# Patient Record
Sex: FEMALE | Race: BLACK OR AFRICAN AMERICAN | NOT HISPANIC OR LATINO | Employment: UNEMPLOYED | URBAN - NONMETROPOLITAN AREA
[De-identification: names, ages, dates, MRNs, and addresses within clinical notes are randomized per-mention and may not be internally consistent; named-entity substitution may affect disease eponyms.]

---

## 2024-05-20 ENCOUNTER — HOSPITAL ENCOUNTER (EMERGENCY)
Facility: HOSPITAL | Age: 59
Discharge: HOME | End: 2024-05-20
Attending: EMERGENCY MEDICINE
Payer: COMMERCIAL

## 2024-05-20 ENCOUNTER — APPOINTMENT (EMERGENCY)
Dept: CT IMAGING | Facility: HOSPITAL | Age: 59
End: 2024-05-20
Payer: COMMERCIAL

## 2024-05-20 ENCOUNTER — APPOINTMENT (EMERGENCY)
Dept: RADIOLOGY | Facility: HOSPITAL | Age: 59
End: 2024-05-20
Payer: COMMERCIAL

## 2024-05-20 VITALS
HEART RATE: 78 BPM | WEIGHT: 164.9 LBS | BODY MASS INDEX: 28.15 KG/M2 | RESPIRATION RATE: 22 BRPM | SYSTOLIC BLOOD PRESSURE: 196 MMHG | TEMPERATURE: 97.2 F | HEIGHT: 64 IN | OXYGEN SATURATION: 100 % | DIASTOLIC BLOOD PRESSURE: 79 MMHG

## 2024-05-20 DIAGNOSIS — R07.9 CHEST PAIN: Primary | ICD-10-CM

## 2024-05-20 DIAGNOSIS — K20.90 ESOPHAGITIS: ICD-10-CM

## 2024-05-20 LAB
2HR DELTA HS TROPONIN: 0 NG/L
ALBUMIN SERPL BCP-MCNC: 4.4 G/DL (ref 3.5–5)
ALP SERPL-CCNC: 84 U/L (ref 34–104)
ALT SERPL W P-5'-P-CCNC: 38 U/L (ref 7–52)
ANION GAP SERPL CALCULATED.3IONS-SCNC: 4 MMOL/L (ref 4–13)
APTT PPP: 24 SECONDS (ref 23–37)
AST SERPL W P-5'-P-CCNC: 24 U/L (ref 13–39)
ATRIAL RATE: 84 BPM
BILIRUB SERPL-MCNC: 0.37 MG/DL (ref 0.2–1)
BNP SERPL-MCNC: 48 PG/ML (ref 0–100)
BUN SERPL-MCNC: 9 MG/DL (ref 5–25)
CALCIUM SERPL-MCNC: 9.5 MG/DL (ref 8.4–10.2)
CARDIAC TROPONIN I PNL SERPL HS: 3 NG/L
CARDIAC TROPONIN I PNL SERPL HS: 3 NG/L
CHLORIDE SERPL-SCNC: 104 MMOL/L (ref 96–108)
CO2 SERPL-SCNC: 29 MMOL/L (ref 21–32)
CREAT SERPL-MCNC: 0.69 MG/DL (ref 0.6–1.3)
D DIMER PPP FEU-MCNC: 0.43 UG/ML FEU
ERYTHROCYTE [DISTWIDTH] IN BLOOD BY AUTOMATED COUNT: 13.2 % (ref 11.6–15.1)
GFR SERPL CREATININE-BSD FRML MDRD: 94 ML/MIN/1.73SQ M
GLUCOSE SERPL-MCNC: 172 MG/DL (ref 65–140)
HCT VFR BLD AUTO: 41.4 % (ref 34.8–46.1)
HGB BLD-MCNC: 13.9 G/DL (ref 11.5–15.4)
INR PPP: 0.93 (ref 0.84–1.19)
LIPASE SERPL-CCNC: 14 U/L (ref 11–82)
MAGNESIUM SERPL-MCNC: 2.1 MG/DL (ref 1.9–2.7)
MCH RBC QN AUTO: 29 PG (ref 26.8–34.3)
MCHC RBC AUTO-ENTMCNC: 33.6 G/DL (ref 31.4–37.4)
MCV RBC AUTO: 86 FL (ref 82–98)
P AXIS: 71 DEGREES
PLATELET # BLD AUTO: 226 THOUSANDS/UL (ref 149–390)
PMV BLD AUTO: 11.1 FL (ref 8.9–12.7)
POTASSIUM SERPL-SCNC: 4.4 MMOL/L (ref 3.5–5.3)
PR INTERVAL: 144 MS
PROT SERPL-MCNC: 7.2 G/DL (ref 6.4–8.4)
PROTHROMBIN TIME: 12.7 SECONDS (ref 11.6–14.5)
QRS AXIS: 66 DEGREES
QRSD INTERVAL: 74 MS
QT INTERVAL: 354 MS
QTC INTERVAL: 418 MS
RBC # BLD AUTO: 4.8 MILLION/UL (ref 3.81–5.12)
SODIUM SERPL-SCNC: 137 MMOL/L (ref 135–147)
T WAVE AXIS: 79 DEGREES
VENTRICULAR RATE: 84 BPM
WBC # BLD AUTO: 4.21 THOUSAND/UL (ref 4.31–10.16)

## 2024-05-20 PROCEDURE — 83735 ASSAY OF MAGNESIUM: CPT | Performed by: EMERGENCY MEDICINE

## 2024-05-20 PROCEDURE — 71275 CT ANGIOGRAPHY CHEST: CPT

## 2024-05-20 PROCEDURE — 71046 X-RAY EXAM CHEST 2 VIEWS: CPT

## 2024-05-20 PROCEDURE — 99285 EMERGENCY DEPT VISIT HI MDM: CPT

## 2024-05-20 PROCEDURE — 85610 PROTHROMBIN TIME: CPT | Performed by: EMERGENCY MEDICINE

## 2024-05-20 PROCEDURE — 85730 THROMBOPLASTIN TIME PARTIAL: CPT | Performed by: EMERGENCY MEDICINE

## 2024-05-20 PROCEDURE — 83690 ASSAY OF LIPASE: CPT | Performed by: EMERGENCY MEDICINE

## 2024-05-20 PROCEDURE — 99285 EMERGENCY DEPT VISIT HI MDM: CPT | Performed by: EMERGENCY MEDICINE

## 2024-05-20 PROCEDURE — 93010 ELECTROCARDIOGRAM REPORT: CPT | Performed by: INTERNAL MEDICINE

## 2024-05-20 PROCEDURE — 93005 ELECTROCARDIOGRAM TRACING: CPT

## 2024-05-20 PROCEDURE — 96375 TX/PRO/DX INJ NEW DRUG ADDON: CPT

## 2024-05-20 PROCEDURE — 84484 ASSAY OF TROPONIN QUANT: CPT | Performed by: EMERGENCY MEDICINE

## 2024-05-20 PROCEDURE — 83880 ASSAY OF NATRIURETIC PEPTIDE: CPT | Performed by: EMERGENCY MEDICINE

## 2024-05-20 PROCEDURE — 96374 THER/PROPH/DIAG INJ IV PUSH: CPT

## 2024-05-20 PROCEDURE — 85025 COMPLETE CBC W/AUTO DIFF WBC: CPT | Performed by: EMERGENCY MEDICINE

## 2024-05-20 PROCEDURE — 36415 COLL VENOUS BLD VENIPUNCTURE: CPT | Performed by: EMERGENCY MEDICINE

## 2024-05-20 PROCEDURE — 85379 FIBRIN DEGRADATION QUANT: CPT | Performed by: EMERGENCY MEDICINE

## 2024-05-20 PROCEDURE — 80053 COMPREHEN METABOLIC PANEL: CPT | Performed by: EMERGENCY MEDICINE

## 2024-05-20 RX ORDER — LIDOCAINE HYDROCHLORIDE 20 MG/ML
15 SOLUTION OROPHARYNGEAL ONCE
Status: COMPLETED | OUTPATIENT
Start: 2024-05-20 | End: 2024-05-20

## 2024-05-20 RX ORDER — ASPIRIN 81 MG/1
81 TABLET ORAL DAILY
COMMUNITY

## 2024-05-20 RX ORDER — GABAPENTIN 600 MG/1
600 TABLET ORAL 3 TIMES DAILY
COMMUNITY
Start: 2024-02-16

## 2024-05-20 RX ORDER — ASPIRIN 81 MG/1
324 TABLET, CHEWABLE ORAL ONCE
Status: COMPLETED | OUTPATIENT
Start: 2024-05-20 | End: 2024-05-20

## 2024-05-20 RX ORDER — AMLODIPINE BESYLATE 10 MG/1
10 TABLET ORAL DAILY
COMMUNITY
Start: 2024-04-10 | End: 2025-04-10

## 2024-05-20 RX ORDER — PROCHLORPERAZINE MALEATE 5 MG/1
5 TABLET ORAL EVERY 8 HOURS PRN
COMMUNITY
Start: 2024-04-10

## 2024-05-20 RX ORDER — PANTOPRAZOLE SODIUM 40 MG/1
40 TABLET, DELAYED RELEASE ORAL DAILY
COMMUNITY
Start: 2024-04-10 | End: 2025-04-10

## 2024-05-20 RX ORDER — HYDRALAZINE HYDROCHLORIDE 10 MG/1
10 TABLET, FILM COATED ORAL 2 TIMES DAILY
COMMUNITY
Start: 2024-02-20

## 2024-05-20 RX ORDER — FENTANYL CITRATE 50 UG/ML
25 INJECTION, SOLUTION INTRAMUSCULAR; INTRAVENOUS ONCE
Status: COMPLETED | OUTPATIENT
Start: 2024-05-20 | End: 2024-05-20

## 2024-05-20 RX ORDER — INSULIN GLARGINE 100 [IU]/ML
40 INJECTION, SOLUTION SUBCUTANEOUS 2 TIMES DAILY
COMMUNITY
Start: 2024-03-14

## 2024-05-20 RX ORDER — METOCLOPRAMIDE HYDROCHLORIDE 5 MG/5ML
10 SOLUTION ORAL ONCE
Status: COMPLETED | OUTPATIENT
Start: 2024-05-20 | End: 2024-05-20

## 2024-05-20 RX ORDER — INSULIN LISPRO 100 [IU]/ML
12 INJECTION, SOLUTION INTRAVENOUS; SUBCUTANEOUS 2 TIMES DAILY WITH MEALS
COMMUNITY
Start: 2024-03-14

## 2024-05-20 RX ORDER — MAGNESIUM HYDROXIDE/ALUMINUM HYDROXICE/SIMETHICONE 120; 1200; 1200 MG/30ML; MG/30ML; MG/30ML
30 SUSPENSION ORAL ONCE
Status: COMPLETED | OUTPATIENT
Start: 2024-05-20 | End: 2024-05-20

## 2024-05-20 RX ORDER — MORPHINE SULFATE 4 MG/ML
4 INJECTION, SOLUTION INTRAMUSCULAR; INTRAVENOUS ONCE
Status: COMPLETED | OUTPATIENT
Start: 2024-05-20 | End: 2024-05-20

## 2024-05-20 RX ORDER — PANTOPRAZOLE SODIUM 20 MG/1
20 TABLET, DELAYED RELEASE ORAL DAILY
Qty: 20 TABLET | Refills: 0 | Status: SHIPPED | OUTPATIENT
Start: 2024-05-20

## 2024-05-20 RX ADMIN — FENTANYL CITRATE 25 MCG: 50 INJECTION INTRAMUSCULAR; INTRAVENOUS at 13:10

## 2024-05-20 RX ADMIN — MORPHINE SULFATE 4 MG: 4 INJECTION INTRAVENOUS at 12:28

## 2024-05-20 RX ADMIN — IOHEXOL 85 ML: 350 INJECTION, SOLUTION INTRAVENOUS at 13:42

## 2024-05-20 RX ADMIN — LIDOCAINE HYDROCHLORIDE 15 ML: 20 SOLUTION ORAL at 14:32

## 2024-05-20 RX ADMIN — ALUMINUM HYDROXIDE, MAGNESIUM HYDROXIDE, AND SIMETHICONE 30 ML: 200; 200; 20 SUSPENSION ORAL at 14:34

## 2024-05-20 RX ADMIN — ASPIRIN 81 MG 324 MG: 81 TABLET ORAL at 12:29

## 2024-05-20 RX ADMIN — METOCLOPRAMIDE HYDROCHLORIDE 10 MG: 5 SOLUTION ORAL at 14:34

## 2024-05-20 NOTE — ED PROVIDER NOTES
"History  Chief Complaint   Patient presents with    Chest Pain     Patient reports intermittent chest pain beginning last week. States the pain is now constant, feels that her \"heart is doing a flip\".      Patient is a 59-year-old female presenting to the emergency department complaining of chest pain that is been going on intermittently for the past week, states it feels like someone squeezing her heart and pain is now radiating to her back, she reports it hurts if she moves a certain way or takes a deep breath or coughs, she has no known prior history of CAD, however has a history of diabetes, hypertension and high cholesterol, she denies any recent illness or injury, no long distance traveling, no recent procedures, she did not take anything for pain prior to coming to the emergency department today        Prior to Admission Medications   Prescriptions Last Dose Informant Patient Reported? Taking?   Insulin Glargine Solostar (Lantus SoloStar) 100 UNIT/ML SOPN   Yes Yes   Sig: Inject 40 Units under the skin 2 (two) times a day   amLODIPine (NORVASC) 10 mg tablet   Yes Yes   Sig: Take 10 mg by mouth daily   aspirin (ECOTRIN LOW STRENGTH) 81 mg EC tablet   Yes Yes   Sig: Take 81 mg by mouth daily   gabapentin (NEURONTIN) 600 MG tablet   Yes Yes   Sig: Take 600 mg by mouth 3 (three) times a day   hydrALAZINE (APRESOLINE) 10 mg tablet   Yes Yes   Sig: Take 10 mg by mouth 2 (two) times a day   insulin lispro (HumaLOG) 100 units/mL injection pen   Yes Yes   Sig: Inject 12 Units under the skin 2 (two) times a day with meals   pantoprazole (PROTONIX) 40 mg tablet   Yes Yes   Sig: Take 40 mg by mouth daily   prochlorperazine (COMPAZINE) 5 mg tablet   Yes Yes   Sig: Take 5 mg by mouth every 8 (eight) hours as needed      Facility-Administered Medications: None       Past Medical History:   Diagnosis Date    Chronic pain syndrome     Diabetic neuropathy (HCC)     Hyperlipidemia     Hyperlipidemia     Hypertension        Past " Surgical History:   Procedure Laterality Date    CHOLECYSTECTOMY      EYE SURGERY  02/2024    SALIVARY GLAND SURGERY  09/2023    Tumor    TUBAL LIGATION         History reviewed. No pertinent family history.  I have reviewed and agree with the history as documented.    E-Cigarette/Vaping    E-Cigarette Use Never User      E-Cigarette/Vaping Substances     Social History     Tobacco Use    Smoking status: Never    Smokeless tobacco: Never   Vaping Use    Vaping status: Never Used   Substance Use Topics    Alcohol use: Not Currently    Drug use: Yes     Types: Marijuana       Review of Systems   Constitutional: Negative.    HENT: Negative.     Eyes: Negative.    Respiratory: Negative.     Cardiovascular:  Positive for chest pain.   Gastrointestinal: Negative.    Endocrine: Negative.    Genitourinary: Negative.    Musculoskeletal:  Positive for back pain.   Skin: Negative.    Allergic/Immunologic: Negative.    Neurological: Negative.    Hematological: Negative.    Psychiatric/Behavioral: Negative.         Physical Exam  Physical Exam  Constitutional:       Appearance: She is well-developed.   HENT:      Head: Normocephalic and atraumatic.   Eyes:      Conjunctiva/sclera: Conjunctivae normal.      Pupils: Pupils are equal, round, and reactive to light.   Cardiovascular:      Rate and Rhythm: Normal rate and regular rhythm.      Heart sounds: Murmur heard.      Systolic murmur is present with a grade of 2/6.   Pulmonary:      Effort: Pulmonary effort is normal.      Breath sounds: Normal breath sounds.   Abdominal:      Palpations: Abdomen is soft.   Musculoskeletal:         General: Normal range of motion.      Cervical back: Normal range of motion and neck supple.   Skin:     General: Skin is warm and dry.   Neurological:      Mental Status: She is alert and oriented to person, place, and time.         Vital Signs  ED Triage Vitals [05/20/24 1210]   Temperature Pulse Respirations Blood Pressure SpO2   (!) 97.2 °F (36.2  °C) 82 16 (!) 198/88 99 %      Temp Source Heart Rate Source Patient Position - Orthostatic VS BP Location FiO2 (%)   Temporal Monitor Lying Left arm --      Pain Score       10 - Worst Possible Pain           Vitals:    05/20/24 1415 05/20/24 1430 05/20/24 1445 05/20/24 1500   BP: (!) 177/79 162/78 (!) 196/79    Pulse: 63 63 67 78   Patient Position - Orthostatic VS:             Visual Acuity      ED Medications  Medications   aspirin chewable tablet 324 mg (324 mg Oral Given 5/20/24 1229)   morphine injection 4 mg (4 mg Intravenous Given 5/20/24 1228)   fentaNYL injection 25 mcg (25 mcg Intravenous Given 5/20/24 1310)   iohexol (OMNIPAQUE) 350 MG/ML injection (MULTI-DOSE) 85 mL (85 mL Intravenous Given 5/20/24 1342)   Lidocaine Viscous HCl (XYLOCAINE) 2 % mucosal solution 15 mL (15 mL Swish & Spit Given 5/20/24 1432)   metoclopramide (REGLAN) oral solution 10 mg (10 mg Oral Given 5/20/24 1434)   aluminum-magnesium hydroxide-simethicone (MAALOX) oral suspension 30 mL (30 mL Oral Given 5/20/24 1434)       Diagnostic Studies  Results Reviewed       Procedure Component Value Units Date/Time    HS Troponin I 2hr [[id number]]  (Normal) Collected: 05/20/24 1418    Lab Status: Final result Specimen: Blood from Arm, Right Updated: 05/20/24 1450     hs TnI 2hr 3 ng/L      Delta 2hr hsTnI 0 ng/L     B-Type Natriuretic Peptide(BNP) [[id number]]  (Normal) Collected: 05/20/24 1226    Lab Status: Final result Specimen: Blood from Arm, Right Updated: 05/20/24 1319     BNP 48 pg/mL     HS Troponin 0hr (reflex protocol) [[id number]]  (Normal) Collected: 05/20/24 1226    Lab Status: Final result Specimen: Blood from Arm, Right Updated: 05/20/24 1258     hs TnI 0hr 3 ng/L     Comprehensive metabolic panel [[id number]]  (Abnormal) Collected: 05/20/24 1226    Lab Status: Final result Specimen: Blood from Arm, Right Updated: 05/20/24 1251     Sodium 137 mmol/L      Potassium 4.4 mmol/L      Chloride 104 mmol/L      CO2 29 mmol/L      ANION  GAP 4 mmol/L      BUN 9 mg/dL      Creatinine 0.69 mg/dL      Glucose 172 mg/dL      Calcium 9.5 mg/dL      AST 24 U/L      ALT 38 U/L      Alkaline Phosphatase 84 U/L      Total Protein 7.2 g/dL      Albumin 4.4 g/dL      Total Bilirubin 0.37 mg/dL      eGFR 94 ml/min/1.73sq m     Narrative:      [employer] guidelines for Chronic Kidney Disease (CKD):     Stage 1 with normal or high GFR (GFR > 90 mL/min/1.73 square meters)    Stage 2 Mild CKD (GFR = 60-89 mL/min/1.73 square meters)    Stage 3A Moderate CKD (GFR = 45-59 mL/min/1.73 square meters)    Stage 3B Moderate CKD (GFR = 30-44 mL/min/1.73 square meters)    Stage 4 Severe CKD (GFR = 15-29 mL/min/1.73 square meters)    Stage 5 End Stage CKD (GFR <15 mL/min/1.73 square meters)  Note: GFR calculation is accurate only with a steady state creatinine    Lipase [[id number]]  (Normal) Collected: 05/20/24 1226    Lab Status: Final result Specimen: Blood from Arm, Right Updated: 05/20/24 1251     Lipase 14 u/L     Magnesium [[id number]]  (Normal) Collected: 05/20/24 1226    Lab Status: Final result Specimen: Blood from Arm, Right Updated: 05/20/24 1251     Magnesium 2.1 mg/dL     D-Dimer [[id number]]  (Normal) Collected: 05/20/24 1226    Lab Status: Final result Specimen: Blood from Arm, Right Updated: 05/20/24 1250     D-Dimer, Quant 0.43 ug/ml FEU     Narrative:      In the evaluation for possible pulmonary embolism, in the appropriate (Well's Score of 4 or less) patient, the age adjusted d-dimer cutoff for this patient can be calculated as:    Age x 0.01 (in ug/mL) for Age-adjusted D-dimer exclusion threshold for a patient over 50 years.    Protime-INR [[id number]]  (Normal) Collected: 05/20/24 1226    Lab Status: Final result Specimen: Blood from Arm, Right Updated: 05/20/24 1247     Protime 12.7 seconds      INR 0.93    APTT [[id number]]  (Normal) Collected: 05/20/24 1226    Lab Status: Final result Specimen: Blood from Arm, Right Updated:  05/20/24 1247     PTT 24 seconds     CBC and differential [[id number]]  (Abnormal) Collected: 05/20/24 1226    Lab Status: Final result Specimen: Blood from Arm, Right Updated: 05/20/24 1244     WBC 4.21 Thousand/uL      RBC 4.80 Million/uL      Hemoglobin 13.9 g/dL      Hematocrit 41.4 %      MCV 86 fL      MCH 29.0 pg      MCHC 33.6 g/dL      RDW 13.2 %      MPV 11.1 fL      Platelets 226 Thousands/uL                    CTA ED chest PE study   Final Result by Davide Salmeron MD (05/20 1414)      No acute findings.      Nonspecific esophagitis.      Workstation performed: [id number]         XR chest 2 views    (Results Pending)              Procedures  ECG 12 Lead Documentation Only    Date/Time: 5/20/2024 12:34 PM    Performed by: Malgorzata Mcmanus DO  Authorized by: Malgorzata Mcmanus DO    Indications / Diagnosis:  Chest pain  ECG reviewed by me, the ED Provider: yes    Patient location:  ED  Previous ECG:     Comparison to cardiac monitor: Yes    Interpretation:     Interpretation: normal    Rate:     ECG rate:  84    ECG rate assessment: normal    Rhythm:     Rhythm: sinus rhythm    T waves:     T waves: inverted      Inverted:  V5 and V6           ED Course             HEART Risk Score      Flowsheet Row Most Recent Value   Heart Score Risk Calculator    History 0 Filed at: 05/20/2024 1828   ECG 0 Filed at: 05/20/2024 1828   Age 1 Filed at: 05/20/2024 1828   Risk Factors 2 Filed at: 05/20/2024 1828   Troponin 0 Filed at: 05/20/2024 1828   HEART Score 3 Filed at: 05/20/2024 1828                                        Medical Decision Making  Exam without evidence of volume overload so doubt heart failure.  EKG without signs of active ischemia.  Given the timing of pain to ER presentation single/delta troponin negative so doubt NSTEMI.  Presentation not consistent with acute PE, pneumothorax (CXR negative), thoracic aortic dissection, pericarditis, tamponade, pneumonia (no signs of infection, CXR  negative), myocarditis.  Heart score low so plan to discharge patient home with PMD follow-up. (no recent illness, Trop negative)    Problems Addressed:  Chest pain: acute illness or injury  Esophagitis: acute illness or injury    Amount and/or Complexity of Data Reviewed  Labs: ordered. Decision-making details documented in ED Course.  Radiology: ordered and independent interpretation performed. Decision-making details documented in ED Course.  ECG/medicine tests: ordered and independent interpretation performed. Decision-making details documented in ED Course.    Risk  OTC drugs.  Prescription drug management.             Disposition  Final diagnoses:   Chest pain   Esophagitis     Time reflects when diagnosis was documented in both MDM as applicable and the Disposition within this note       Time User Action Codes Description Comment    5/20/2024  3:03 PM Malgorzata Mcmanus [R07.9] Chest pain     5/20/2024  3:03 PM Malgorzata Mcmanus [K20.90] Esophagitis           ED Disposition       ED Disposition   Discharge    Condition   Stable    Date/Time   Mon May 20, 2024 1502    Comment   Filomena Sow discharge to home/self care.                   Follow-up Information    None         Discharge Medication List as of 5/20/2024  3:04 PM        START taking these medications    Details   !! pantoprazole (PROTONIX) 20 mg tablet Take 1 tablet (20 mg total) by mouth daily, Starting Mon 5/20/2024, Normal       !! - Potential duplicate medications found. Please discuss with provider.        CONTINUE these medications which have NOT CHANGED    Details   amLODIPine (NORVASC) 10 mg tablet Take 10 mg by mouth daily, Starting Wed 4/10/2024, Until Thu 4/10/2025, Historical Med      aspirin (ECOTRIN LOW STRENGTH) 81 mg EC tablet Take 81 mg by mouth daily, Historical Med      gabapentin (NEURONTIN) 600 MG tablet Take 600 mg by mouth 3 (three) times a day, Starting Fri 2/16/2024, Historical Med      hydrALAZINE (APRESOLINE) 10 mg  tablet Take 10 mg by mouth 2 (two) times a day, Starting Tue 2/20/2024, Historical Med      Insulin Glargine Solostar (Lantus SoloStar) 100 UNIT/ML SOPN Inject 40 Units under the skin 2 (two) times a day, Starting Thu 3/14/2024, Historical Med      insulin lispro (HumaLOG) 100 units/mL injection pen Inject 12 Units under the skin 2 (two) times a day with meals, Starting Thu 3/14/2024, Historical Med      !! pantoprazole (PROTONIX) 40 mg tablet Take 40 mg by mouth daily, Starting Wed 4/10/2024, Until Thu 4/10/2025, Historical Med      prochlorperazine (COMPAZINE) 5 mg tablet Take 5 mg by mouth every 8 (eight) hours as needed, Starting Wed 4/10/2024, Historical Med       !! - Potential duplicate medications found. Please discuss with provider.          No discharge procedures on file.    PDMP Review       None            ED Provider  Electronically Signed by             Malgorzata Mcmanus DO  05/20/24 1827       Malgorzata Mcmanus DO  05/20/24 1828

## 2025-02-15 ENCOUNTER — APPOINTMENT (EMERGENCY)
Dept: CT IMAGING | Facility: HOSPITAL | Age: 60
End: 2025-02-15
Payer: COMMERCIAL

## 2025-02-15 ENCOUNTER — HOSPITAL ENCOUNTER (OUTPATIENT)
Facility: HOSPITAL | Age: 60
Setting detail: OBSERVATION
Discharge: HOME | End: 2025-02-15
Attending: EMERGENCY MEDICINE | Admitting: ANESTHESIOLOGY
Payer: COMMERCIAL

## 2025-02-15 VITALS
DIASTOLIC BLOOD PRESSURE: 73 MMHG | RESPIRATION RATE: 18 BRPM | WEIGHT: 156.31 LBS | SYSTOLIC BLOOD PRESSURE: 154 MMHG | TEMPERATURE: 99.6 F | HEART RATE: 84 BPM | BODY MASS INDEX: 26.69 KG/M2 | HEIGHT: 64 IN | OXYGEN SATURATION: 95 %

## 2025-02-15 DIAGNOSIS — I10 HYPERTENSION: ICD-10-CM

## 2025-02-15 DIAGNOSIS — R51.9 HEADACHE: Primary | ICD-10-CM

## 2025-02-15 PROBLEM — Z86.73 HISTORY OF STROKE: Status: ACTIVE | Noted: 2021-05-04

## 2025-02-15 PROBLEM — E78.2 MIXED HYPERLIPIDEMIA: Status: ACTIVE | Noted: 2023-04-22

## 2025-02-15 PROBLEM — G89.4 CHRONIC PAIN DISORDER: Chronic | Status: ACTIVE | Noted: 2025-02-15

## 2025-02-15 PROBLEM — I16.0 HYPERTENSIVE URGENCY, MALIGNANT: Status: ACTIVE | Noted: 2025-02-15

## 2025-02-15 PROBLEM — I16.0 HYPERTENSIVE URGENCY, MALIGNANT: Status: RESOLVED | Noted: 2025-02-15 | Resolved: 2025-02-15

## 2025-02-15 PROBLEM — I65.23 BILATERAL CAROTID ARTERY STENOSIS: Status: ACTIVE | Noted: 2022-09-08

## 2025-02-15 LAB
2HR DELTA HS TROPONIN: 0 NG/L
ALBUMIN SERPL BCG-MCNC: 4.6 G/DL (ref 3.5–5)
ALP SERPL-CCNC: 97 U/L (ref 34–104)
ALT SERPL W P-5'-P-CCNC: 11 U/L (ref 7–52)
ANION GAP SERPL CALCULATED.3IONS-SCNC: 4 MMOL/L (ref 4–13)
AST SERPL W P-5'-P-CCNC: 12 U/L (ref 13–39)
BASOPHILS # BLD AUTO: 0.03 THOUSANDS/ΜL (ref 0–0.1)
BASOPHILS NFR BLD AUTO: 1 % (ref 0–1)
BILIRUB SERPL-MCNC: 0.59 MG/DL (ref 0.2–1)
BUN SERPL-MCNC: 11 MG/DL (ref 5–25)
CALCIUM SERPL-MCNC: 10 MG/DL (ref 8.4–10.2)
CARDIAC TROPONIN I PNL SERPL HS: 4 NG/L (ref ?–50)
CARDIAC TROPONIN I PNL SERPL HS: 4 NG/L (ref ?–50)
CHLORIDE SERPL-SCNC: 99 MMOL/L (ref 96–108)
CO2 SERPL-SCNC: 32 MMOL/L (ref 21–32)
CREAT SERPL-MCNC: 0.73 MG/DL (ref 0.6–1.3)
EOSINOPHIL # BLD AUTO: 0.09 THOUSAND/ΜL (ref 0–0.61)
EOSINOPHIL NFR BLD AUTO: 2 % (ref 0–6)
ERYTHROCYTE [DISTWIDTH] IN BLOOD BY AUTOMATED COUNT: 13 % (ref 11.6–15.1)
EST. AVERAGE GLUCOSE BLD GHB EST-MCNC: 203 MG/DL
GFR SERPL CREATININE-BSD FRML MDRD: 89 ML/MIN/1.73SQ M
GLUCOSE SERPL-MCNC: 141 MG/DL (ref 65–140)
GLUCOSE SERPL-MCNC: 191 MG/DL (ref 65–140)
GLUCOSE SERPL-MCNC: 248 MG/DL (ref 65–140)
HBA1C MFR BLD: 8.7 %
HCT VFR BLD AUTO: 41.9 % (ref 34.8–46.1)
HGB BLD-MCNC: 14.2 G/DL (ref 11.5–15.4)
IMM GRANULOCYTES # BLD AUTO: 0.01 THOUSAND/UL (ref 0–0.2)
IMM GRANULOCYTES NFR BLD AUTO: 0 % (ref 0–2)
LYMPHOCYTES # BLD AUTO: 0.82 THOUSANDS/ΜL (ref 0.6–4.47)
LYMPHOCYTES NFR BLD AUTO: 14 % (ref 14–44)
MCH RBC QN AUTO: 28.9 PG (ref 26.8–34.3)
MCHC RBC AUTO-ENTMCNC: 33.9 G/DL (ref 31.4–37.4)
MCV RBC AUTO: 85 FL (ref 82–98)
MONOCYTES # BLD AUTO: 0.68 THOUSAND/ΜL (ref 0.17–1.22)
MONOCYTES NFR BLD AUTO: 12 % (ref 4–12)
NEUTROPHILS # BLD AUTO: 4.15 THOUSANDS/ΜL (ref 1.85–7.62)
NEUTS SEG NFR BLD AUTO: 71 % (ref 43–75)
NRBC BLD AUTO-RTO: 0 /100 WBCS
PLATELET # BLD AUTO: 231 THOUSANDS/UL (ref 149–390)
PLATELET # BLD AUTO: 241 THOUSANDS/UL (ref 149–390)
PMV BLD AUTO: 10.2 FL (ref 8.9–12.7)
PMV BLD AUTO: 10.8 FL (ref 8.9–12.7)
POTASSIUM SERPL-SCNC: 4 MMOL/L (ref 3.5–5.3)
PROT SERPL-MCNC: 7.8 G/DL (ref 6.4–8.4)
RBC # BLD AUTO: 4.91 MILLION/UL (ref 3.81–5.12)
SODIUM SERPL-SCNC: 135 MMOL/L (ref 135–147)
TSH SERPL DL<=0.05 MIU/L-ACNC: 1.49 UIU/ML (ref 0.45–4.5)
WBC # BLD AUTO: 5.78 THOUSAND/UL (ref 4.31–10.16)

## 2025-02-15 PROCEDURE — 84484 ASSAY OF TROPONIN QUANT: CPT | Performed by: EMERGENCY MEDICINE

## 2025-02-15 PROCEDURE — 85025 COMPLETE CBC W/AUTO DIFF WBC: CPT | Performed by: EMERGENCY MEDICINE

## 2025-02-15 PROCEDURE — 99285 EMERGENCY DEPT VISIT HI MDM: CPT | Performed by: EMERGENCY MEDICINE

## 2025-02-15 PROCEDURE — 93005 ELECTROCARDIOGRAM TRACING: CPT

## 2025-02-15 PROCEDURE — 80053 COMPREHEN METABOLIC PANEL: CPT | Performed by: EMERGENCY MEDICINE

## 2025-02-15 PROCEDURE — NC001 PR NO CHARGE

## 2025-02-15 PROCEDURE — 96376 TX/PRO/DX INJ SAME DRUG ADON: CPT

## 2025-02-15 PROCEDURE — 83036 HEMOGLOBIN GLYCOSYLATED A1C: CPT | Performed by: PHYSICIAN ASSISTANT

## 2025-02-15 PROCEDURE — 99223 1ST HOSP IP/OBS HIGH 75: CPT | Performed by: ANESTHESIOLOGY

## 2025-02-15 PROCEDURE — 96375 TX/PRO/DX INJ NEW DRUG ADDON: CPT

## 2025-02-15 PROCEDURE — 96374 THER/PROPH/DIAG INJ IV PUSH: CPT

## 2025-02-15 PROCEDURE — 85049 AUTOMATED PLATELET COUNT: CPT | Performed by: PHYSICIAN ASSISTANT

## 2025-02-15 PROCEDURE — 99285 EMERGENCY DEPT VISIT HI MDM: CPT

## 2025-02-15 PROCEDURE — 36415 COLL VENOUS BLD VENIPUNCTURE: CPT | Performed by: EMERGENCY MEDICINE

## 2025-02-15 PROCEDURE — 70450 CT HEAD/BRAIN W/O DYE: CPT

## 2025-02-15 PROCEDURE — 82948 REAGENT STRIP/BLOOD GLUCOSE: CPT

## 2025-02-15 PROCEDURE — 84443 ASSAY THYROID STIM HORMONE: CPT

## 2025-02-15 RX ORDER — LOSARTAN POTASSIUM 50 MG/1
50 TABLET ORAL DAILY
Status: DISCONTINUED | OUTPATIENT
Start: 2025-02-15 | End: 2025-02-15 | Stop reason: HOSPADM

## 2025-02-15 RX ORDER — METOPROLOL TARTRATE 1 MG/ML
5 INJECTION, SOLUTION INTRAVENOUS ONCE
Status: COMPLETED | OUTPATIENT
Start: 2025-02-15 | End: 2025-02-15

## 2025-02-15 RX ORDER — HEPARIN SODIUM 5000 [USP'U]/ML
5000 INJECTION, SOLUTION INTRAVENOUS; SUBCUTANEOUS EVERY 8 HOURS SCHEDULED
Status: DISCONTINUED | OUTPATIENT
Start: 2025-02-15 | End: 2025-02-15 | Stop reason: HOSPADM

## 2025-02-15 RX ORDER — INSULIN LISPRO 100 [IU]/ML
12 INJECTION, SOLUTION INTRAVENOUS; SUBCUTANEOUS
Status: DISCONTINUED | OUTPATIENT
Start: 2025-02-15 | End: 2025-02-15 | Stop reason: HOSPADM

## 2025-02-15 RX ORDER — HYDROMORPHONE HCL/PF 1 MG/ML
0.5 SYRINGE (ML) INJECTION ONCE
Refills: 0 | Status: COMPLETED | OUTPATIENT
Start: 2025-02-15 | End: 2025-02-15

## 2025-02-15 RX ORDER — CLONIDINE HYDROCHLORIDE 0.1 MG/1
0.2 TABLET ORAL ONCE
Status: COMPLETED | OUTPATIENT
Start: 2025-02-15 | End: 2025-02-15

## 2025-02-15 RX ORDER — LOSARTAN POTASSIUM 50 MG/1
50 TABLET ORAL DAILY
Qty: 30 TABLET | Refills: 0 | Status: SHIPPED | OUTPATIENT
Start: 2025-02-16

## 2025-02-15 RX ORDER — METOPROLOL TARTRATE 50 MG
50 TABLET ORAL EVERY 12 HOURS SCHEDULED
Status: DISCONTINUED | OUTPATIENT
Start: 2025-02-15 | End: 2025-02-15

## 2025-02-15 RX ORDER — GABAPENTIN 300 MG/1
600 CAPSULE ORAL 3 TIMES DAILY
Status: DISCONTINUED | OUTPATIENT
Start: 2025-02-15 | End: 2025-02-15 | Stop reason: HOSPADM

## 2025-02-15 RX ORDER — HYDROMORPHONE HCL IN WATER/PF 6 MG/30 ML
0.2 PATIENT CONTROLLED ANALGESIA SYRINGE INTRAVENOUS ONCE
Status: COMPLETED | OUTPATIENT
Start: 2025-02-15 | End: 2025-02-15

## 2025-02-15 RX ORDER — HYDROCHLOROTHIAZIDE 12.5 MG/1
12.5 TABLET ORAL DAILY
Status: DISCONTINUED | OUTPATIENT
Start: 2025-02-15 | End: 2025-02-15

## 2025-02-15 RX ORDER — INSULIN GLARGINE 100 [IU]/ML
40 INJECTION, SOLUTION SUBCUTANEOUS
Status: DISCONTINUED | OUTPATIENT
Start: 2025-02-15 | End: 2025-02-15 | Stop reason: HOSPADM

## 2025-02-15 RX ORDER — AMLODIPINE BESYLATE 10 MG/1
10 TABLET ORAL DAILY
Qty: 30 TABLET | Refills: 0 | Status: SHIPPED | OUTPATIENT
Start: 2025-02-15 | End: 2026-02-15

## 2025-02-15 RX ORDER — CHLORHEXIDINE GLUCONATE ORAL RINSE 1.2 MG/ML
15 SOLUTION DENTAL EVERY 12 HOURS SCHEDULED
Status: DISCONTINUED | OUTPATIENT
Start: 2025-02-15 | End: 2025-02-15 | Stop reason: HOSPADM

## 2025-02-15 RX ORDER — ACETAMINOPHEN 325 MG/1
975 TABLET ORAL EVERY 6 HOURS PRN
Status: DISCONTINUED | OUTPATIENT
Start: 2025-02-15 | End: 2025-02-15 | Stop reason: HOSPADM

## 2025-02-15 RX ORDER — HYDRALAZINE HYDROCHLORIDE 20 MG/ML
5 INJECTION INTRAMUSCULAR; INTRAVENOUS ONCE
Status: COMPLETED | OUTPATIENT
Start: 2025-02-15 | End: 2025-02-15

## 2025-02-15 RX ORDER — ASPIRIN 81 MG/1
81 TABLET ORAL DAILY
Status: DISCONTINUED | OUTPATIENT
Start: 2025-02-15 | End: 2025-02-15 | Stop reason: HOSPADM

## 2025-02-15 RX ORDER — LOSARTAN POTASSIUM 50 MG/1
50 TABLET ORAL DAILY
Status: DISCONTINUED | OUTPATIENT
Start: 2025-02-15 | End: 2025-02-15

## 2025-02-15 RX ORDER — PANTOPRAZOLE SODIUM 40 MG/1
40 TABLET, DELAYED RELEASE ORAL DAILY
Status: DISCONTINUED | OUTPATIENT
Start: 2025-02-15 | End: 2025-02-15 | Stop reason: HOSPADM

## 2025-02-15 RX ORDER — METOPROLOL TARTRATE 50 MG
50 TABLET ORAL EVERY 12 HOURS SCHEDULED
Status: DISCONTINUED | OUTPATIENT
Start: 2025-02-15 | End: 2025-02-15 | Stop reason: HOSPADM

## 2025-02-15 RX ORDER — HYDROCHLOROTHIAZIDE 25 MG/1
25 TABLET ORAL DAILY
Status: DISCONTINUED | OUTPATIENT
Start: 2025-02-15 | End: 2025-02-15

## 2025-02-15 RX ORDER — HYDROCHLOROTHIAZIDE 12.5 MG/1
25 TABLET ORAL DAILY
Qty: 60 TABLET | Refills: 0 | Status: SHIPPED | OUTPATIENT
Start: 2025-02-15

## 2025-02-15 RX ORDER — HYDRALAZINE HYDROCHLORIDE 10 MG/1
10 TABLET, FILM COATED ORAL EVERY 8 HOURS SCHEDULED
Status: DISCONTINUED | OUTPATIENT
Start: 2025-02-15 | End: 2025-02-15 | Stop reason: HOSPADM

## 2025-02-15 RX ORDER — AMLODIPINE BESYLATE 10 MG/1
10 TABLET ORAL DAILY
Status: DISCONTINUED | OUTPATIENT
Start: 2025-02-15 | End: 2025-02-15 | Stop reason: HOSPADM

## 2025-02-15 RX ORDER — HYDRALAZINE HYDROCHLORIDE 10 MG/1
10 TABLET, FILM COATED ORAL EVERY 8 HOURS SCHEDULED
Status: DISCONTINUED | OUTPATIENT
Start: 2025-02-15 | End: 2025-02-15

## 2025-02-15 RX ORDER — HYDRALAZINE HYDROCHLORIDE 25 MG/1
25 TABLET, FILM COATED ORAL EVERY 8 HOURS SCHEDULED
Status: DISCONTINUED | OUTPATIENT
Start: 2025-02-15 | End: 2025-02-15

## 2025-02-15 RX ORDER — HYDRALAZINE HYDROCHLORIDE 10 MG/1
10 TABLET, FILM COATED ORAL 2 TIMES DAILY
Status: DISCONTINUED | OUTPATIENT
Start: 2025-02-15 | End: 2025-02-15

## 2025-02-15 RX ORDER — METOPROLOL TARTRATE 50 MG
50 TABLET ORAL EVERY 12 HOURS SCHEDULED
Qty: 60 TABLET | Refills: 0 | Status: SHIPPED | OUTPATIENT
Start: 2025-02-15

## 2025-02-15 RX ADMIN — CLONIDINE HYDROCHLORIDE 0.2 MG: 0.1 TABLET ORAL at 03:20

## 2025-02-15 RX ADMIN — HEPARIN SODIUM 5000 UNITS: 5000 INJECTION INTRAVENOUS; SUBCUTANEOUS at 13:22

## 2025-02-15 RX ADMIN — CHLORHEXIDINE GLUCONATE 0.12% ORAL RINSE 15 ML: 1.2 LIQUID ORAL at 08:06

## 2025-02-15 RX ADMIN — METOPROLOL TARTRATE 50 MG: 50 TABLET, FILM COATED ORAL at 13:22

## 2025-02-15 RX ADMIN — AMLODIPINE BESYLATE 10 MG: 10 TABLET ORAL at 08:01

## 2025-02-15 RX ADMIN — GABAPENTIN 600 MG: 300 CAPSULE ORAL at 08:01

## 2025-02-15 RX ADMIN — HYDROMORPHONE HYDROCHLORIDE 0.5 MG: 1 INJECTION, SOLUTION INTRAMUSCULAR; INTRAVENOUS; SUBCUTANEOUS at 04:05

## 2025-02-15 RX ADMIN — METOROPROLOL TARTRATE 5 MG: 5 INJECTION, SOLUTION INTRAVENOUS at 02:06

## 2025-02-15 RX ADMIN — METOPROLOL TARTRATE 5 MG: 5 INJECTION INTRAVENOUS at 04:09

## 2025-02-15 RX ADMIN — HYDRALAZINE HYDROCHLORIDE 25 MG: 25 TABLET ORAL at 06:39

## 2025-02-15 RX ADMIN — INSULIN LISPRO 6 UNITS: 100 INJECTION, SOLUTION INTRAVENOUS; SUBCUTANEOUS at 08:09

## 2025-02-15 RX ADMIN — SODIUM CHLORIDE 5 MG/HR: 0.9 INJECTION, SOLUTION INTRAVENOUS at 05:56

## 2025-02-15 RX ADMIN — HYDRALAZINE HYDROCHLORIDE 5 MG: 20 INJECTION INTRAMUSCULAR; INTRAVENOUS at 01:33

## 2025-02-15 RX ADMIN — PANTOPRAZOLE SODIUM 40 MG: 40 TABLET, DELAYED RELEASE ORAL at 08:01

## 2025-02-15 RX ADMIN — ASPIRIN 81 MG: 81 TABLET, COATED ORAL at 08:01

## 2025-02-15 RX ADMIN — HYDRALAZINE HYDROCHLORIDE 10 MG: 10 TABLET ORAL at 15:20

## 2025-02-15 RX ADMIN — INSULIN GLARGINE 40 UNITS: 100 INJECTION, SOLUTION SUBCUTANEOUS at 08:13

## 2025-02-15 RX ADMIN — GABAPENTIN 600 MG: 300 CAPSULE ORAL at 15:20

## 2025-02-15 RX ADMIN — LOSARTAN POTASSIUM 50 MG: 50 TABLET, FILM COATED ORAL at 10:01

## 2025-02-15 RX ADMIN — HEPARIN SODIUM 5000 UNITS: 5000 INJECTION INTRAVENOUS; SUBCUTANEOUS at 06:40

## 2025-02-15 RX ADMIN — HYDROMORPHONE HYDROCHLORIDE 0.2 MG: 0.2 INJECTION, SOLUTION INTRAMUSCULAR; INTRAVENOUS; SUBCUTANEOUS at 02:45

## 2025-02-15 NOTE — ASSESSMENT & PLAN NOTE
With hypertensive urgency with blood pressures greater than 200 systolic after receiving Lopressor hydralazine Catapres the ER, sees Dilaudid 0.5 and Dilaudid 0.2 for the associated headaches she had with her hypertension   Started on Cardene drip  Start her home medications and increase hydralazine to 25 twice daily   Consider renal ultrasound for renal artery stenosis  Usually is controlled with her blood pressure  Has a history of carotid stenosis status post carotid enterectomy 2015

## 2025-02-15 NOTE — PLAN OF CARE
Problem: PAIN - ADULT  Goal: Verbalizes/displays adequate comfort level or baseline comfort level  Description: Interventions:  - Encourage patient to monitor pain and request assistance  - Assess pain using appropriate pain scale  - Administer analgesics based on type and severity of pain and evaluate response  - Implement non-pharmacological measures as appropriate and evaluate response  - Consider cultural and social influences on pain and pain management  - Notify physician/advanced practitioner if interventions unsuccessful or patient reports new pain  Outcome: Progressing     Problem: INFECTION - ADULT  Goal: Absence or prevention of progression during hospitalization  Description: INTERVENTIONS:  - Assess and monitor for signs and symptoms of infection  - Monitor lab/diagnostic results  - Monitor all insertion sites, i.e. indwelling lines, tubes, and drains  - Monitor endotracheal if appropriate and nasal secretions for changes in amount and color  - [place] appropriate cooling/warming therapies per order  - Administer medications as ordered  - Instruct and encourage patient and family to use good hand hygiene technique  - Identify and instruct in appropriate isolation precautions for identified infection/condition  Outcome: Progressing  Goal: Absence of fever/infection during neutropenic period  Description: INTERVENTIONS:  - Monitor WBC    Outcome: Progressing     Problem: SAFETY ADULT  Goal: Patient will remain free of falls  Description: INTERVENTIONS:  - Educate patient/family on patient safety including physical limitations  - Instruct patient to call for assistance with activity   - Consult OT/PT to assist with strengthening/mobility   - Keep Call bell within reach  - Keep bed low and locked with side rails adjusted as appropriate  - Keep care items and personal belongings within reach  - Initiate and maintain comfort rounds  Outcome: Progressing  Goal: Maintain or return to baseline ADL  function  Description: INTERVENTIONS:  -  Assess patient's ability to carry out ADLs; assess patient's baseline for ADL function and identify physical deficits which impact ability to perform ADLs (bathing, care of mouth/teeth, toileting, grooming, dressing, etc.)  - Assess/evaluate cause of self-care deficits   - Assess range of motion  - Assess patient's mobility; develop plan if impaired  - Assess patient's need for assistive devices and provide as appropriate  - Encourage maximum independence but intervene and supervise when necessary  - Involve family in performance of ADLs  - Provide patient education as appropriate  Outcome: Progressing  Goal: Maintains/Returns to pre admission functional level  Description: INTERVENTIONS:  - Perform AM-PAC 6 Click Basic Mobility/ Daily Activity assessment daily.  - Set and communicate daily mobility goal to care team and patient/family/caregiver.   - Collaborate with rehabilitation services on mobility goals if consulted  - Out of bed for toileting  - Record patient progress and toleration of activity level   Outcome: Progressing     Problem: DISCHARGE PLANNING  Goal: Discharge to home or other facility with appropriate resources  Description: INTERVENTIONS:  - Identify barriers to discharge w/patient and caregiver  - Arrange for needed discharge resources and transportation as appropriate  - Identify discharge learning needs (meds, wound care, etc.)  - Arrange for interpretive services to assist at discharge as needed  - Refer to Case Management Department for coordinating discharge planning if the patient needs post-hospital services based on physician/advanced practitioner order or complex needs related to functional status, cognitive ability, or social support system  Outcome: Progressing     Problem: Knowledge Deficit  Goal: Patient/family/caregiver demonstrates understanding of disease process, treatment plan, medications, and discharge instructions  Description:  Complete learning assessment and assess knowledge base.  Interventions:  - Provide teaching at level of understanding  - Provide teaching via preferred learning methods  Outcome: Progressing

## 2025-02-15 NOTE — ED PROVIDER NOTES
Surgery Note    Ar Pulliam is an 35 year old female who presents for evaluation of epigastric pain.  Patient presented to the ED on two occasions for severe pain and nausea.  The patient went to [facility] by ambulance on 10/14/23.  Her pain resolved in the ED and an US performed there showed cholelithiasis.  Patient presented to the [facility] ED on 10/20/23 for pain that started after eating pizza.  US at that time did not show cholelithiasis but her LFTs were elevated at that time.  LFTs have since normalized.  Patient saw GI who suspected she may have passed a stone.  Patient had her second child on 9/7/23.  She reports GERD with pregnancy but this epigastric pain feels completely different from that.    Past Medical History:   Diagnosis Date    Anemia during pregnancy in third trimester 07/14/2023    Health care maintenance 11/14/2023    History of gestational diabetes     Insulin controlled gestational diabetes mellitus (GDM) during pregnancy     PCO (polycystic ovaries) 01/04/2021    Poor fetal growth affecting management of mother 06/15/2023    Preexisting diabetes complicating pregnancy in second trimester, antepartum      Past Surgical History:   Procedure Laterality Date    Dilation and curettage of uterus  2020     Family History   Problem Relation Age of Onset    Hypertension Mother     Cancer, Breast Mother 66        2018; in remission    Diabetes Father     Hypertension Father     Asthma Brother     Cancer, Colon Neg Hx     Cancer, Ovarian Neg Hx     Cancer, Esophageal Neg Hx     Cancer, Rectal Neg Hx     Cancer, Stomach Neg Hx      Social History     Tobacco Use    Smoking status: Never    Smokeless tobacco: Never   Substance Use Topics    Alcohol use: Not Currently       Current Outpatient Medications   Medication Sig Dispense Refill    Multiple Vitamin (MULTI VITAMIN DAILY PO)        No current facility-administered medications for this visit.     Allergies: ALLERGIES:  No Known  Time reflects when diagnosis was documented in both MDM as applicable and the Disposition within this note       Time User Action Codes Description Comment    2/15/2025  4:47 AM Malgorzata Mcmanus [R51.9] Headache     2/15/2025  4:47 AM Malgorzata Mcmanus [I10] Hypertension           ED Disposition       ED Disposition   Admit    Condition   Stable    Date/Time   Sat Feb 15, 2025  5:17 AM    Comment   Case was discussed with Patricia Guy and the patient's admission status was agreed to be Admission Status: observation status to the service of Dr. Polanco .               Assessment & Plan       Medical Decision Making  Patient presents to the emergency department complaining of high blood pressure accompanied by headache.  Patient is otherwise asymptomatic without confusion, chest pain, hematuria or shortness of breath.  Initial BP today is 234/102, multiple medications were given with temporary decrease in blood pressure, however hypertension persists.  Patient currently on medications for blood pressure control.  Doubt ACS, heart failure, renal infarction or failure.  Patient discussed with Birgit for admission, recommended critical care evaluate patient, critical care evaluated patient and will admit as stepdown 1, requested Cardene drip be started    Problems Addressed:  Headache: acute illness or injury  Hypertension: acute illness or injury    Amount and/or Complexity of Data Reviewed  Labs: ordered. Decision-making details documented in ED Course.  Radiology: ordered. Decision-making details documented in ED Course.  ECG/medicine tests: ordered and independent interpretation performed. Decision-making details documented in ED Course.    Risk  Prescription drug management.  Decision regarding hospitalization.        ED Course as of 02/15/25 0520   Sat Feb 15, 2025   0518 HS Troponin I 2hr   0518 HS Troponin 0hr (reflex protocol)   0518 Comprehensive metabolic panel(!)   0518 CBC and differential   0518 CT  Allergies    Blood pressure 122/80, pulse 80, temperature 99 °F (37.2 °C), temperature source Oral, height 5' (1.524 m), weight 68.2 kg (150 lb 4.8 oz), last menstrual period 11/27/2022, not currently breastfeeding.    Review of Systems   Gastrointestinal:  Positive for abdominal pain and nausea.   All other systems reviewed and are negative.       Physical Exam  Constitutional:       General: She is not in acute distress.     Appearance: Normal appearance.   HENT:      Head: Normocephalic and atraumatic.      Nose: Nose normal.      Mouth/Throat:      Mouth: Mucous membranes are moist.   Eyes:      General: No scleral icterus.     Extraocular Movements: Extraocular movements intact.   Cardiovascular:      Rate and Rhythm: Normal rate.   Pulmonary:      Effort: Pulmonary effort is normal. No respiratory distress.   Abdominal:      General: There is no distension.      Palpations: Abdomen is soft.      Tenderness: There is no abdominal tenderness.   Musculoskeletal:         General: No swelling or deformity.      Cervical back: Neck supple. No rigidity.   Skin:     General: Skin is warm and dry.      Coloration: Skin is not jaundiced.   Neurological:      General: No focal deficit present.      Mental Status: She is alert.      Cranial Nerves: No cranial nerve deficit.   Psychiatric:         Mood and Affect: Mood normal.         Behavior: Behavior normal.       Imaging:  ULTRASOUND OF THE ABDOMEN     INDICATION: 35 years old Female with epigastric pain     COMPARISON: CT 2/25/2022.     TECHNIQUE: Targeted right upper quadrant abdominal ultrasound was performed  utilizing grayscale and color Doppler imaging.      FINDINGS:     Liver: The liver demonstrates normal echogenicity and contours. The right  hepatic lobe measures up to 14.6 cm. No focal hepatic lesions are identified.     Biliary tree: There is no intrahepatic or extrahepatic biliary ductal dilation.  The common bile duct measures 4 mm in diameter.    head without contrast   0518 ECG 12 lead       Medications   niCARdipine (CARDENE) 25 mg (STANDARD CONCENTRATION) in sodium chloride 0.9% 250 mL (has no administration in time range)   hydrALAZINE (APRESOLINE) injection 5 mg (5 mg Intravenous Given 2/15/25 0133)   metoprolol (LOPRESSOR) injection 5 mg (5 mg Intravenous Given 2/15/25 0206)   HYDROmorphone HCl (DILAUDID) injection 0.2 mg (0.2 mg Intravenous Given 2/15/25 0245)   cloNIDine (CATAPRES) tablet 0.2 mg (0.2 mg Oral Given 2/15/25 0320)   metoprolol (LOPRESSOR) injection 5 mg (5 mg Intravenous Given 2/15/25 0409)   HYDROmorphone (DILAUDID) injection 0.5 mg (0.5 mg Intravenous Given 2/15/25 0405)       ED Risk Strat Scores                                                History of Present Illness       Chief Complaint   Patient presents with    Headache     Pt began having a headache at 2100 last night. Pt checked her BP and it was 200 systolic. Pt took her BP medicine.        Past Medical History:   Diagnosis Date    Chronic pain syndrome     Diabetic neuropathy (HCC)     Hyperlipidemia     Hyperlipidemia     Hypertension       Past Surgical History:   Procedure Laterality Date    CHOLECYSTECTOMY      EYE SURGERY  02/2024    SALIVARY GLAND SURGERY  09/2023    Tumor    TUBAL LIGATION        History reviewed. No pertinent family history.   Social History     Tobacco Use    Smoking status: Never    Smokeless tobacco: Never   Vaping Use    Vaping status: Never Used   Substance Use Topics    Alcohol use: Not Currently    Drug use: Yes     Types: Marijuana      E-Cigarette/Vaping    E-Cigarette Use Never User       E-Cigarette/Vaping Substances      I have reviewed and agree with the history as documented.     Patient is a 59-year-old female presenting to the emergency department complaining of headache and high blood pressure, states she started to feel a headache around 9 PM in the evening, she checked her blood pressure and it was greater than 200/100, she took    Gallbladder: The gallbladder is borderline in distention. No shadowing echogenic calculi, gallbladder wall thickening, or pericholecystic fluid. There is no pericholecystic fluid.  The sonographer noted a negative sonographic Cross's sign.     The proximal visualized pancreas appears unremarkable.     IMPRESSION:  1.   Borderline gallbladder distention. Otherwise, no cholelithiasis or  sonographic signs of acute cholecystitis.     2.   The liver appears sonographically within normal limits.     Electronically Signed by: WESLEY HIRSCH DO   Signed on: 10/20/2023 9:15 AM      EXAM: Cholescintigraphy with CCK.     Isotope: 5.2 mCi of Tc-99m Choletec and 1.3 mcg of CCK.     CLINICAL INDICATION: 35 years Female with abdominal pain for biliary scintigraphic evaluation.     PROCEDURE: After intravenous administration of 5.2 mCi of Tc-99m Choletec  dynamic imaging of the abdomen was performed. 1.3 mcg of CCK was  intravenously infused during the procedure for gall bladder ejection fraction estimation.     FINDINGS: Following the isotope administration there is prompt uptake of  radiotracer noted with in the liver, followed by delineation of the hepatic ducts, common bile duct, gall bladder and passage of isotope into the small bowel.       The visualization of the gall bladder and small bowel are indicative of patency of cystic and common bile ducts. No focal radiotracer abnormalities are seen in the liver or in the common bile duct regions.     The estimated gall bladder ejection fraction is 8.4%, which is low and is suspicious for biliary dyskinesia or chronic Cholecystitis. The normal range of gall bladder ejection fraction in our lab is greater than 35%.     IMPRESSION:  Abnormal Study with low gall bladder ejection fraction of 8.4%, suspicious  for Biliary dyskinesia or Chronic cholecystitis.     Electronically Signed by: ZACARIAS CASILLAS M.D.   Signed on: 12/20/2023 8:00 PM       Assessment:  Ar Pulliam is an 35  an extra dose of her blood pressure medication but symptoms did not improve, she denies any chest pain or shortness of breath, no cough or congestion, no nausea vomiting or diarrhea, no recent illness or injury, denies missing any doses of her usual blood pressure medication        Review of Systems   Constitutional: Negative.    HENT: Negative.     Eyes: Negative.    Respiratory: Negative.     Cardiovascular: Negative.    Gastrointestinal: Negative.    Endocrine: Negative.    Genitourinary: Negative.    Musculoskeletal: Negative.    Skin: Negative.    Allergic/Immunologic: Negative.    Neurological:  Positive for headaches.   Hematological: Negative.    Psychiatric/Behavioral: Negative.             Objective       ED Triage Vitals   Temperature Pulse Blood Pressure Respirations SpO2 Patient Position - Orthostatic VS   02/15/25 0101 02/15/25 0101 02/15/25 0101 02/15/25 0101 02/15/25 0101 02/15/25 0101   98.2 °F (36.8 °C) 85 (!) 234/107 17 98 % Sitting      Temp Source Heart Rate Source BP Location FiO2 (%) Pain Score    02/15/25 0101 02/15/25 0101 02/15/25 0101 -- 02/15/25 0245    Temporal Monitor Right arm  9      Vitals      Date and Time Temp Pulse SpO2 Resp BP Pain Score FACES Pain Rating User   02/15/25 0518 -- -- -- -- 209/94 -- --    02/15/25 0515 -- 86 97 % 18 209/94 -- --    02/15/25 0509 -- -- -- -- 192/86 -- --    02/15/25 0445 -- 83 98 % 18 186/87 -- --    02/15/25 0405 -- -- -- -- -- 7 --    02/15/25 0330 -- 84 99 % 17 221/98 -- --    02/15/25 0320 -- -- -- -- 217/93 -- --    02/15/25 0245 -- -- -- -- -- 9 --    02/15/25 0200 -- 84 97 % 17 203/96 -- --    02/15/25 0133 -- -- -- -- 228/107 -- --    02/15/25 0101 98.2 °F (36.8 °C) 85 98 % 17 234/107 -- -- RG            Physical Exam  Constitutional:       Appearance: Normal appearance. She is well-developed.   HENT:      Head: Normocephalic and atraumatic.      Nose: Nose normal.      Mouth/Throat:      Mouth: Mucous membranes are  moist.   Eyes:      Conjunctiva/sclera: Conjunctivae normal.      Pupils: Pupils are equal, round, and reactive to light.   Cardiovascular:      Rate and Rhythm: Normal rate.   Pulmonary:      Effort: Pulmonary effort is normal.   Abdominal:      Palpations: Abdomen is soft.   Musculoskeletal:         General: Normal range of motion.      Cervical back: Normal range of motion and neck supple.   Skin:     General: Skin is warm and dry.   Neurological:      Mental Status: She is alert and oriented to person, place, and time.         Results Reviewed       Procedure Component Value Units Date/Time    HS Troponin I 2hr [[id number]]  (Normal) Collected: 02/15/25 0327    Lab Status: Final result Specimen: Blood from Arm, Right Updated: 02/15/25 0356     hs TnI 2hr 4 ng/L      Delta 2hr hsTnI 0 ng/L     HS Troponin 0hr (reflex protocol) [[id number]]  (Normal) Collected: 02/15/25 0133    Lab Status: Final result Specimen: Blood from Arm, Right Updated: 02/15/25 0204     hs TnI 0hr 4 ng/L     Comprehensive metabolic panel [[id number]]  (Abnormal) Collected: 02/15/25 0133    Lab Status: Final result Specimen: Blood from Arm, Right Updated: 02/15/25 0200     Sodium 135 mmol/L      Potassium 4.0 mmol/L      Chloride 99 mmol/L      CO2 32 mmol/L      ANION GAP 4 mmol/L      BUN 11 mg/dL      Creatinine 0.73 mg/dL      Glucose 191 mg/dL      Calcium 10.0 mg/dL      AST 12 U/L      ALT 11 U/L      Alkaline Phosphatase 97 U/L      Total Protein 7.8 g/dL      Albumin 4.6 g/dL      Total Bilirubin 0.59 mg/dL      eGFR 89 ml/min/1.73sq m     Narrative:      National Kid[employer] Foundation guidelines for Chronic Kidney Disease (CKD):     Stage 1 with normal or high GFR (GFR > 90 mL/min/1.73 square meters)    Stage 2 Mild CKD (GFR = 60-89 mL/min/1.73 square meters)    Stage 3A Moderate CKD (GFR = 45-59 mL/min/1.73 square meters)    Stage 3B Moderate CKD (GFR = 30-44 mL/min/1.73 square meters)    Stage 4 Severe CKD (GFR = 15-29  year old female who presents for evaluation of epigastric pain.  Patient's clinical picture is consistent with symptomatic cholelithiasis and chronic cholecystitis.  US at [facility] was negative for cholelithiasis but the patient did have a visualized stone at the US performed at [facility].  Patient brought this US report to her appointment.    Plan:  Long discussion held with the patient.  I recommended she undergo a laparoscopic possible open cholecystectomy.  The patient has been compliant with a low fat diet and has not experienced another episode of severe pain, however she presented to the ED twice within one week in October and the pain was very severe.  I suspect she will have more episodes in the future since she is only 35.    Patient is anxious to undergo surgery.  She is currently on maternity leave with plans to return to work in May 2024.  I explained that cholecystectomy for her is not emergent but I would recommend she undergo surgery within the next 1-2 months to avoid another severe episode of pain.  All of the patient's questions were addressed.  The patient verbalized understanding.  The patient would like to think about surgery and discuss it more later.  She will need to discuss childcare during her postop period with her family.  Patient to follow-up via telehealth in 1 week.    Christianne Hayden MD FACS  12/29/2023   mL/min/1.73 square meters)    Stage 5 End Stage CKD (GFR <15 mL/min/1.73 square meters)  Note: GFR calculation is accurate only with a steady state creatinine    CBC and differential [[id number]] Collected: 02/15/25 0133    Lab Status: Final result Specimen: Blood from Arm, Right Updated: 02/15/25 0141     WBC 5.78 Thousand/uL      RBC 4.91 Million/uL      Hemoglobin 14.2 g/dL      Hematocrit 41.9 %      MCV 85 fL      MCH 28.9 pg      MCHC 33.9 g/dL      RDW 13.0 %      MPV 10.2 fL      Platelets 231 Thousands/uL      nRBC 0 /100 WBCs      Segmented % 71 %      Immature Grans % 0 %      Lymphocytes % 14 %      Monocytes % 12 %      Eosinophils Relative 2 %      Basophils Relative 1 %      Absolute Neutrophils 4.15 Thousands/µL      Absolute Immature Grans 0.01 Thousand/uL      Absolute Lymphocytes 0.82 Thousands/µL      Absolute Monocytes 0.68 Thousand/µL      Eosinophils Absolute 0.09 Thousand/µL      Basophils Absolute 0.03 Thousands/µL             CT head without contrast   Final Interpretation by Medardo Ardon MD (02/15 0301)      No acute intracranial abnormality. Old infarct at the right occipital region                  Workstation performed: [id number]             ECG 12 Lead Documentation Only    Date/Time: 2/15/2025 2:56 AM    Performed by: Malgorzata Mcmanus DO  Authorized by: Malgorzata Mcmanus DO    Indications / Diagnosis:  Palpitations  ECG reviewed by me, the ED Provider: yes    Patient location:  ED  Previous ECG:     Comparison to cardiac monitor: Yes    Interpretation:     Interpretation: non-specific    Rate:     ECG rate:  77    ECG rate assessment: normal    Rhythm:     Rhythm: sinus rhythm    Ectopy:     Ectopy: none    QRS:     QRS intervals:  Normal  Conduction:     Conduction: normal    ST segments:     ST segments:  Normal  T waves:     T waves: inverted      Inverted:  II, aVL, V4, V5 and V6      ED Medication and Procedure Management   Prior to Admission Medications   Prescriptions Last Dose  Informant Patient Reported? Taking?   Insulin Glargine Solostar (Lantus SoloStar) 100 UNIT/ML SOPN   Yes No   Sig: Inject 40 Units under the skin 2 (two) times a day   amLODIPine (NORVASC) 10 mg tablet   Yes No   Sig: Take 10 mg by mouth daily   aspirin (ECOTRIN LOW STRENGTH) 81 mg EC tablet   Yes No   Sig: Take 81 mg by mouth daily   gabapentin (NEURONTIN) 600 MG tablet   Yes No   Sig: Take 600 mg by mouth 3 (three) times a day   hydrALAZINE (APRESOLINE) 10 mg tablet   Yes No   Sig: Take 10 mg by mouth 2 (two) times a day   insulin lispro (HumaLOG) 100 units/mL injection pen   Yes No   Sig: Inject 12 Units under the skin 2 (two) times a day with meals   pantoprazole (PROTONIX) 20 mg tablet   No No   Sig: Take 1 tablet (20 mg total) by mouth daily   pantoprazole (PROTONIX) 40 mg tablet   Yes No   Sig: Take 40 mg by mouth daily   prochlorperazine (COMPAZINE) 5 mg tablet   Yes No   Sig: Take 5 mg by mouth every 8 (eight) hours as needed      Facility-Administered Medications: None     Patient's Medications   Discharge Prescriptions    No medications on file     No discharge procedures on file.  ED SEPSIS DOCUMENTATION   Time reflects when diagnosis was documented in both MDM as applicable and the Disposition within this note       Time User Action Codes Description Comment    2/15/2025  4:47 AM Malgorzata Mcmanus [R51.9] Headache     2/15/2025  4:47 AM Malgorzata Mcmanus [I10] Hypertension                  Malgorzata Mcamnus DO  02/15/25 0520

## 2025-02-15 NOTE — DISCHARGE SUMMARY
"Discharge Summary - Critical Care/ICU   Name: Filomena Sow 59 y.o. female I MRN: [id number]  Unit/Bed#: -01 I Date of Admission: 2/15/2025   Date of Service: 2/15/2025 I Hospital Day: 0    Admission Date: 2/15/2025 0103  Discharge Date: 02/15/25  Admitting Diagnosis: Hypertension [I10]  Headache [R51.9]  Discharge Diagnosis:   Medical Problems       Resolved Problems  Date Reviewed: 2/15/2025          Resolved    * (Principal) Hypertensive urgency, malignant 2/15/2025     Resolved by  Bari Ch PA-C          HPI: As per Patricia Guy PA-C 2/15 \"Filomena Sow is a 59 y.o. medical history of chronic pain, diabetic and diabetic neuropathy, hypertension comes to the emergency department complaining of a significant headache associated with very high blood pressure.  Patient is otherwise asymptomatic denies any confusion chest pain hematuria or shortness of breath.  Labs also unremarkable  Initial blood pressures 234/102 he received several medications in the ER without any resolution of hypertension remains over 200.   Will admit as observation for Cardene drip and readjust her home medications\"    Procedures Performed:   Orders Placed This Encounter   Procedures    ED ECG Documentation Only       Summary of Hospital Course:  58 y/o F hx HTN, T2DM, prior occipital CVA, b/l carotid stenosis who presented with headache and was admitted 2/15 for HTN emergency. CTH on admission negative. Initially with SBP > 200, blood pressure did not respond to IV lopressor, hydralazine, or PO catapres and she was subsequently started on a cardene gtt with BP goal 110-160mmHg. She was titrated off cardene gtt and restarted on her home regimen consisting of metoprolol 50mg BID, losartan 50mg daily, norvasc 10mg daily and hydralazine 10mg BID with adequate BP control. She can resume HCTZ 25mg daily tomorrow. At the time of discharge she remains hemodynamically stable. She is agreeable to the plan of following up with PCP " on Monday.     Significant Findings, Care, Treatment and Services Provided: Cardene gtt - HTN emergency     Complications: none    Condition at Discharge: good       Discharge instructions/Information to patient and family:   See After Visit Summary (AVS) for information provided to patient and family.      Provisions for Follow-Up Care:  See after visit summary for information related to follow-up care and any pertinent home health orders.      PCP: No primary care provider on file.    Disposition: Home    Planned Readmission: No     Discharge Medications:  See after visit summary for reconciled discharge medications provided to patient and family.

## 2025-02-15 NOTE — HOSPITAL COURSE
Filomena Sow is a 59 y.o. medical history of chronic pain, diabetic and diabetic neuropathy, hypertension comes to the emergency department complaining of a significant headache associated with very high blood pressure.  Patient is otherwise asymptomatic denies any confusion chest pain hematuria or shortness of breath.  Labs also unremarkable  Initial blood pressures 234/102 he received several medications in the ER without any resolution of hypertension remains over 200.   Will admit as observation for Cardene drip and readjust her home medications

## 2025-02-15 NOTE — DISCHARGE INSTR - AVS FIRST PAGE
Return to the emergency department with chest pain, shortness of breath, uncontrollable headache, vision changes.     Follow-up with [facility] primary care office in [place] Monday to schedule appointment.   Phone number: [phone]  Providers are Dr. Tana Suarez and Julisas Yoon PA-C    Continue taking Metoprolol 50mg BID, hydralazine 10mg BID, losartan 50mg once daily, amlodipine 10mg once daily, and hydrochlorothiazide 25mg once daily.

## 2025-02-15 NOTE — H&P
"H&P - Critical Care/ICU   Name: Filomena Sow 59 y.o. female I MRN: [id number]  Unit/Bed#: -01 I Date of Admission: 2/15/2025   Date of Service: 2/15/2025 I Hospital Day: 0       Assessment & Plan  Hypertensive urgency, malignant  With hypertensive urgency with blood pressures greater than 200 systolic after receiving Lopressor hydralazine Catapres the ER, sees Dilaudid 0.5 and Dilaudid 0.2 for the associated headaches she had with her hypertension   Started on Cardene drip  Start her home medications and increase hydralazine to 25 twice daily   Consider renal ultrasound for renal artery stenosis  Usually is controlled with her blood pressure  Has a history of carotid stenosis status post carotid enterectomy 2015  History of stroke  History of an old stroke no deficits  CT scan shows old infarct in the occipital region  Type 2 diabetes mellitus treated with insulin (HCC)  Lab Results   Component Value Date    HGBA1C 8.4 (H) 03/07/2024       No results for input(s): \"POCGLU\" in the last 72 hours.    Blood Sugar Average: Last 72 hrs:    Hemoglobin A1c  Continue Lantus 40 units twice daily  Continue insulin lispro 12 units twice daily with meals  Insulin sliding scale  Diabetic diet      Bilateral carotid artery stenosis  This post carotid endarterectomy in 2015  Chronic pain disorder  Patient has a history of chronic pain  Controlled with gabapentin 600 mg 3 times daily  Disposition: Stepdown Level 1    History of Present Illness   Filomena Sow is a 59 y.o. medical history of chronic pain, diabetic and diabetic neuropathy, hypertension comes to the emergency department complaining of a significant headache associated with very high blood pressure.  Patient is otherwise asymptomatic denies any confusion chest pain hematuria or shortness of breath.  Labs also unremarkable  Initial blood pressures 234/102 he received several medications in the ER without any resolution of hypertension remains over 200.   Will " admit as observation for Cardene drip and readjust her home medications    History obtained from chart review and the patient.  Review of Systems: Review of Systems   Musculoskeletal:  Positive for neck pain.   Neurological:  Positive for headaches.   All other systems reviewed and are negative.      Historical Information   Past Medical History:  No date: Chronic pain syndrome  No date: Diabetic neuropathy (HCC)  No date: Hyperlipidemia  No date: Hyperlipidemia  No date: Hypertension Past Surgical History:  No date: CHOLECYSTECTOMY  02/2024: EYE SURGERY  09/2023: SALIVARY GLAND SURGERY      Comment:  Tumor  No date: TUBAL LIGATION   Current Outpatient Medications   Medication Instructions    amLODIPine (NORVASC) 10 mg, Oral, Daily    aspirin (ECOTRIN LOW STRENGTH) 81 mg, Oral, Daily    gabapentin (NEURONTIN) 600 mg, Oral, 3 times daily    hydrALAZINE (APRESOLINE) 10 mg, Oral, 2 times daily    Insulin Glargine Solostar (LANTUS SOLOSTAR) 40 Units, Subcutaneous, 2 times daily    insulin lispro (HUMALOG) 12 Units, Subcutaneous, 2 times daily with meals    pantoprazole (PROTONIX) 40 mg, Oral, Daily    pantoprazole (PROTONIX) 20 mg, Oral, Daily    prochlorperazine (COMPAZINE) 5 mg, Oral, Every 8 hours PRN    Allergies   Allergen Reactions    Metformin Diarrhea    Cilostazol Palpitations    Rosuvastatin Other (See Comments) and Palpitations     Abnormal liver function      Social History     Tobacco Use    Smoking status: Never    Smokeless tobacco: Never   Vaping Use    Vaping status: Never Used   Substance Use Topics    Alcohol use: Not Currently    Drug use: Yes     Types: Marijuana    History reviewed. No pertinent family history.       Objective :          Complaining of a headache               Vitals I/O      Most Recent Min/Max in 24hrs   Temp 98.2 °F (36.8 °C) Temp  Min: 98.2 °F (36.8 °C)  Max: 98.2 °F (36.8 °C)   Pulse 82 Pulse  Min: 82  Max: 89   Resp 18 Resp  Min: 17  Max: 18   BP (!) 188/81 BP  Min: 186/87   Max: 234/107   O2 Sat 99 % SpO2  Min: 97 %  Max: 99 %    No intake or output data in the 24 hours ending 02/15/25 0612    Diet Manuel/CHO Controlled; Consistent Carbohydrate Diet Level 3 (6 carb servings/90 grams CHO/meal)    Invasive Monitoring           Physical Exam   Physical Exam  Vitals and nursing note reviewed.   Eyes:      Extraocular Movements: Extraocular movements intact.      Pupils: Pupils are equal, round, and reactive to light.   Skin:     General: Skin is warm, dry and not mottled extremities.      Capillary Refill: Capillary refill takes less than 2 seconds.      Coloration: Skin is not pale.   HENT:      Head: Normocephalic and atraumatic.   Cardiovascular:      Rate and Rhythm: Normal rate and regular rhythm.      Pulses: Normal pulses.      Heart sounds: Normal heart sounds.   Musculoskeletal:         General: Normal range of motion.      Cervical back: Full passive range of motion without pain, normal range of motion and neck supple.   Abdominal: General: Bowel sounds are normal.      Palpations: Abdomen is soft.   Constitutional:       General: She is awake.      Appearance: She is well-developed and well-nourished.   Pulmonary:      Effort: Pulmonary effort is normal.      Breath sounds: Normal breath sounds. No wheezing or rhonchi.   Psychiatric:         Behavior: Behavior is cooperative.   Neurological:      General: No focal deficit present.      Mental Status: She is alert, easily aroused and oriented to person, place and time. Mental status is at baseline.      Sensory: Sensation is intact.      Motor: gross motor function is at baseline for patient. Strength full and intact in all extremities.          Diagnostic Studies        Lab Results: I have reviewed the following results:     Medications:  Scheduled PRN   amLODIPine, 10 mg, Daily  aspirin, 81 mg, Daily  chlorhexidine, 15 mL, Q12H ALEXANDER  gabapentin, 600 mg, TID  heparin (porcine), 5,000 Units, Q8H ALEXANDER  hydrALAZINE, 25 mg, Q8H  ALEXANDER  insulin glargine, 40 Units, Daily With Breakfast  insulin glargine, 40 Units, HS  insulin lispro, 12 Units, Daily With Breakfast  insulin lispro, 12 Units, Daily With Dinner  pantoprazole, 40 mg, Daily          Continuous    niCARdipine, 1-15 mg/hr, Last Rate: 5 mg/hr (02/15/25 0556)         Labs:   CBC    Recent Labs     02/15/25  0133   WBC 5.78   HGB 14.2   HCT 41.9        BMP    Recent Labs     02/15/25  0133   SODIUM 135   K 4.0   CL 99   CO2 32   AGAP 4   BUN 11   CREATININE 0.73   CALCIUM 10.0       Coags    No recent results     Additional Electrolytes  No recent results       Blood Gas    No recent results  No recent results LFTs  Recent Labs     02/15/25  0133   ALT 11   AST 12*   ALKPHOS 97   ALB 4.6   TBILI 0.59       Infectious  No recent results  Glucose  Recent Labs     02/15/25  0133   GLUC 191*        Administrative Statements

## 2025-02-15 NOTE — PLAN OF CARE
Problem: PAIN - ADULT  Goal: Verbalizes/displays adequate comfort level or baseline comfort level  Description: Interventions:  - Encourage patient to monitor pain and request assistance  - Assess pain using appropriate pain scale  - Administer analgesics based on type and severity of pain and evaluate response  - Implement non-pharmacological measures as appropriate and evaluate response  - Consider cultural and social influences on pain and pain management  - Notify physician/advanced practitioner if interventions unsuccessful or patient reports new pain  2/15/2025 1547 by Lesly Matson RN  Outcome: Adequate for Discharge  2/15/2025 0811 by Lesly Matson RN  Outcome: Progressing     Problem: INFECTION - ADULT  Goal: Absence or prevention of progression during hospitalization  Description: INTERVENTIONS:  - Assess and monitor for signs and symptoms of infection  - Monitor lab/diagnostic results  - Monitor all insertion sites, i.e. indwelling lines, tubes, and drains  - Monitor endotracheal if appropriate and nasal secretions for changes in amount and color  - [place] appropriate cooling/warming therapies per order  - Administer medications as ordered  - Instruct and encourage patient and family to use good hand hygiene technique  - Identify and instruct in appropriate isolation precautions for identified infection/condition  2/15/2025 1547 by Lesly Matson RN  Outcome: Adequate for Discharge  2/15/2025 0811 by Lesly Matson RN  Outcome: Progressing  Goal: Absence of fever/infection during neutropenic period  Description: INTERVENTIONS:  - Monitor WBC    2/15/2025 1547 by Lesly Matson RN  Outcome: Adequate for Discharge  2/15/2025 0811 by Lesly Matson RN  Outcome: Progressing     Problem: SAFETY ADULT  Goal: Patient will remain free of falls  Description: INTERVENTIONS:  - Educate patient/family on patient safety including physical limitations  - Instruct patient to call for  assistance with activity   - Consult OT/PT to assist with strengthening/mobility   - Keep Call bell within reach  - Keep bed low and locked with side rails adjusted as appropriate  - Keep care items and personal belongings within reach  - Initiate and maintain comfort rounds  2/15/2025 1547 by Lesly Matson RN  Outcome: Adequate for Discharge  2/15/2025 0811 by Lesly Matson RN  Outcome: Progressing  Goal: Maintain or return to baseline ADL function  Description: INTERVENTIONS:  -  Assess patient's ability to carry out ADLs; assess patient's baseline for ADL function and identify physical deficits which impact ability to perform ADLs (bathing, care of mouth/teeth, toileting, grooming, dressing, etc.)  - Assess/evaluate cause of self-care deficits   - Assess range of motion  - Assess patient's mobility; develop plan if impaired  - Assess patient's need for assistive devices and provide as appropriate  - Encourage maximum independence but intervene and supervise when necessary  - Involve family in performance of ADLs  - Provide patient education as appropriate  2/15/2025 1547 by Lesly Matson RN  Outcome: Adequate for Discharge  2/15/2025 0811 by Lesly Matson RN  Outcome: Progressing  Goal: Maintains/Returns to pre admission functional level  Description: INTERVENTIONS:  - Perform AM-PAC 6 Click Basic Mobility/ Daily Activity assessment daily.  - Set and communicate daily mobility goal to care team and patient/family/caregiver.   - Collaborate with rehabilitation services on mobility goals if consulted  - Out of bed for toileting  - Record patient progress and toleration of activity level   2/15/2025 1547 by Lesly Matson RN  Outcome: Adequate for Discharge  2/15/2025 0811 by Lesly Matson RN  Outcome: Progressing     Problem: DISCHARGE PLANNING  Goal: Discharge to home or other facility with appropriate resources  Description: INTERVENTIONS:  - Identify barriers to discharge  w/patient and caregiver  - Arrange for needed discharge resources and transportation as appropriate  - Identify discharge learning needs (meds, wound care, etc.)  - Arrange for interpretive services to assist at discharge as needed  - Refer to Case Management Department for coordinating discharge planning if the patient needs post-hospital services based on physician/advanced practitioner order or complex needs related to functional status, cognitive ability, or social support system  2/15/2025 1547 by Lesly Matson RN  Outcome: Adequate for Discharge  2/15/2025 0811 by Lesly Matson RN  Outcome: Progressing     Problem: Knowledge Deficit  Goal: Patient/family/caregiver demonstrates understanding of disease process, treatment plan, medications, and discharge instructions  Description: Complete learning assessment and assess knowledge base.  Interventions:  - Provide teaching at level of understanding  - Provide teaching via preferred learning methods  2/15/2025 1547 by Lesly Matson RN  Outcome: Adequate for Discharge  2/15/2025 0811 by Lesly Matson RN  Outcome: Progressing

## 2025-02-15 NOTE — ASSESSMENT & PLAN NOTE
"Lab Results   Component Value Date    HGBA1C 8.4 (H) 03/07/2024       No results for input(s): \"POCGLU\" in the last 72 hours.    Blood Sugar Average: Last 72 hrs:    Hemoglobin A1c  Continue Lantus 40 units twice daily  Continue insulin lispro 12 units twice daily with meals  Insulin sliding scale  Diabetic diet      "

## 2025-02-17 LAB
ATRIAL RATE: 77 BPM
P AXIS: 67 DEGREES
PR INTERVAL: 150 MS
QRS AXIS: 57 DEGREES
QRSD INTERVAL: 74 MS
QT INTERVAL: 362 MS
QTC INTERVAL: 409 MS
T WAVE AXIS: 183 DEGREES
VENTRICULAR RATE: 77 BPM